# Patient Record
(demographics unavailable — no encounter records)

---

## 2024-10-25 NOTE — HISTORY OF PRESENT ILLNESS
[de-identified] : cough [FreeTextEntry6] : Presents with c/o cough/congestion intermittently over past few weeks.   Low grade fever.  Meds given: cetirizine PRN. Ibuprofen PRN. Hylands OTC.  Appetite/activity at baseline, drinking well, good UO.  No vomiting/No diarrhea.   + /sick contacts, +COVID exposure.  Requests viral panel - c/o COVID/MYCO.

## 2024-10-25 NOTE — HISTORY OF PRESENT ILLNESS
[de-identified] : cough [FreeTextEntry6] : Presents with c/o cough/congestion intermittently over past few weeks.   Low grade fever.  Meds given: cetirizine PRN. Ibuprofen PRN. Hylands OTC.  Appetite/activity at baseline, drinking well, good UO.  No vomiting/No diarrhea.   + /sick contacts, +COVID exposure.  Requests viral panel - c/o COVID/MYCO.

## 2024-10-25 NOTE — DISCUSSION/SUMMARY
[FreeTextEntry1] :  2 year old female with acute URI - well hydrated.  Likely viral - no indication for antibiotic use at this time.   Supportive care reviewed -- may use Zarbees PRN, Cetirizine 5ml qHS PRN, Nasal saline PRN, cool mist humidifier, steam up bathroom.  SALINE NEBS prn Good hydration discussed & good hand hygiene reviewed  If fever develops/persists > 48hr or condition worsens return for re-eval. RED FLAGS REVIEWED - indications for ED eval discussed, signs of distress/dehydration reviewed - parent agrees with plan, demonstrates an understanding, is able to repeat back instructions and has no questions at this time.   AAP 5210 reviewed -- once feeling better may resume normal activity & diet.  Return sooner PRN.  Well care as scheduled.

## 2024-10-25 NOTE — PHYSICAL EXAM
[Playful] : playful [NL] : warm, clear [Lethargic] : not lethargic [Clear Rhinorrhea] : clear rhinorrhea [Anterior Cervical] : anterior cervical

## 2024-11-20 NOTE — DISCUSSION/SUMMARY
[FreeTextEntry1] : In no acute distress. Appears adequately hydrated. Symptoms likely due to viral URI. Recommend supportive care including antipyretics, fluids, and nasal saline followed by nasal suction. Return if symptoms worsen or persist.

## 2024-11-20 NOTE — HISTORY OF PRESENT ILLNESS
[FreeTextEntry6] : Patient's chief complaint is L ear pain x3-4 days.  Presents with c/o cough/congestion x 3-4 days. +irritable  Fever: none   Meds given: Motrin  Appetite/activity at baseline, drinking well, good UO. No vomiting/No diarrhea. + school/sick contacts.

## 2024-11-20 NOTE — PHYSICAL EXAM
[Clear Rhinorrhea] : clear rhinorrhea [Palpated at following regions:] : palpated at following regions: [Anterior Cervical] : anterior cervical [NL] : warm, clear